# Patient Record
Sex: FEMALE | Race: OTHER | NOT HISPANIC OR LATINO
[De-identification: names, ages, dates, MRNs, and addresses within clinical notes are randomized per-mention and may not be internally consistent; named-entity substitution may affect disease eponyms.]

---

## 2021-10-07 PROBLEM — Z00.00 ENCOUNTER FOR PREVENTIVE HEALTH EXAMINATION: Status: ACTIVE | Noted: 2021-10-07

## 2021-10-11 ENCOUNTER — APPOINTMENT (OUTPATIENT)
Dept: ORTHOPEDIC SURGERY | Facility: CLINIC | Age: 51
End: 2021-10-11
Payer: COMMERCIAL

## 2021-10-11 ENCOUNTER — RESULT REVIEW (OUTPATIENT)
Age: 51
End: 2021-10-11

## 2021-10-11 ENCOUNTER — OUTPATIENT (OUTPATIENT)
Dept: OUTPATIENT SERVICES | Facility: HOSPITAL | Age: 51
LOS: 1 days | End: 2021-10-11
Payer: COMMERCIAL

## 2021-10-11 VITALS
SYSTOLIC BLOOD PRESSURE: 126 MMHG | WEIGHT: 175 LBS | HEART RATE: 80 BPM | OXYGEN SATURATION: 98 % | BODY MASS INDEX: 28.12 KG/M2 | DIASTOLIC BLOOD PRESSURE: 88 MMHG | HEIGHT: 66 IN

## 2021-10-11 DIAGNOSIS — Z78.9 OTHER SPECIFIED HEALTH STATUS: ICD-10-CM

## 2021-10-11 DIAGNOSIS — Z83.3 FAMILY HISTORY OF DIABETES MELLITUS: ICD-10-CM

## 2021-10-11 DIAGNOSIS — Z72.3 LACK OF PHYSICAL EXERCISE: ICD-10-CM

## 2021-10-11 DIAGNOSIS — Z86.39 PERSONAL HISTORY OF OTHER ENDOCRINE, NUTRITIONAL AND METABOLIC DISEASE: ICD-10-CM

## 2021-10-11 DIAGNOSIS — M23.41 LOOSE BODY IN KNEE, RIGHT KNEE: ICD-10-CM

## 2021-10-11 DIAGNOSIS — Z84.1 FAMILY HISTORY OF DISORDERS OF KIDNEY AND URETER: ICD-10-CM

## 2021-10-11 PROCEDURE — 72170 X-RAY EXAM OF PELVIS: CPT | Mod: 26

## 2021-10-11 PROCEDURE — 20610 DRAIN/INJ JOINT/BURSA W/O US: CPT

## 2021-10-11 PROCEDURE — 99204 OFFICE O/P NEW MOD 45 MIN: CPT | Mod: 25

## 2021-10-11 PROCEDURE — 73564 X-RAY EXAM KNEE 4 OR MORE: CPT | Mod: 26,50

## 2021-10-11 PROCEDURE — 72170 X-RAY EXAM OF PELVIS: CPT

## 2021-10-11 PROCEDURE — 73564 X-RAY EXAM KNEE 4 OR MORE: CPT

## 2021-10-11 RX ORDER — ZOLPIDEM TARTRATE 5 MG/1
TABLET, FILM COATED ORAL
Refills: 0 | Status: ACTIVE | COMMUNITY

## 2021-10-11 RX ORDER — LEVOTHYROXINE SODIUM 0.15 MG/1
150 TABLET ORAL
Refills: 0 | Status: ACTIVE | COMMUNITY

## 2021-10-11 RX ORDER — BUPROPION HYDROCHLORIDE 300 MG/1
300 TABLET, EXTENDED RELEASE ORAL
Refills: 0 | Status: ACTIVE | COMMUNITY

## 2021-10-11 NOTE — PHYSICAL EXAM
[de-identified] : General appearance: well nourished and hydrated, pleasant, alert and oriented x 3, cooperative.  \par HEENT: normocephalic, EOM intact, wearing mask, external auditory canal clear.  \par Cardiovascular: no lower leg edema, no varicosities, dorsalis pedis pulses palpable and symmetric.  \par Lymphatics: no palpable lymphadenopathy, no lymphedema.  \par Neurologic: sensation is normal, no muscle weakness in upper or lower extremities, patella tendon reflexes present and symmetric.  \par Dermatologic: skin moist, warm, no rash.  \par Spine: cervical spine with normal lordosis and painless range of motion, thoracic spine with normal kyphosis and painless range of motion, lumbosacral spine with normal lordosis and painless range of motion. \par Gait: normal.  \par \par Left knee:\par - Soft tissue swelling: moderate suprapatellar\par - Ecchymosis: none\par - Erythema: none\par - Effusion: small\par - Wounds: none\par - Alignment: mild valgus\par - Tenderness: circumferential particularly suprapatellar; palpable large loose body in the lateral gutter\par - ROM: 0-135\par - Collateral laxity: none\par - Cruciate laxity: none\par - Popliteal angle (degrees): 45\par - Quad strength: 5/5\par \par Right knee:\par - Soft tissue swelling: moderate suprapatellar\par - Ecchymosis: none\par - Erythema: none\par - Effusion: small\par - Wounds: none\par - Alignment: mild valgus\par - Tenderness: circumferential particularly suprapatellar; palpable large loose body in the lateral gutter\par - ROM: 0-135\par - Collateral laxity: none\par - Cruciate laxity: none\par - Popliteal angle (degrees): 45\par - Quad strength: 5/5 [de-identified] : AP pelvis and 4 views of the bilateral knees (weightbearing AP, weightbearing Jacobsen, weightbearing lateral, and Sunrise) were obtained today, interpreted by me, and reviewed with the patient.\par \par Pelvic alignment: normal\par \par Right hip --\par Alignment: normal\par Arthritis: none\par Deformity: none\par Osteonecrosis: none\par \par Left hip --\par Alignment: normal \par Arthritis: none\par Deformity: none\par Osteonecrosis: none\par \par Right knee -- large ossific loose body in lateral gutter\par Alignment: normal\par Arthritis: moderate tibiofemoral, bone on bone patellofemoral\par Patellar height: normal\par Patellar tracking: lateral facet effacement and lateral subluxation\par \par Left knee -- large ossific loose body in lateral gutter\par Alignment: normal\par Arthritis: moderate tibiofemoral, bone on bone patellofemoral\par Patellar height: normal\par Patellar tracking: lateral facet effacement and lateral subluxation

## 2021-10-11 NOTE — HISTORY OF PRESENT ILLNESS
[___ wks] : [unfilled] week(s) ago [8] : a current pain level of 8/10 [Standing] : standing [Bending] : worsened by bending [Walking] : worsened by walking [Ice] : relieved by ice [Rest] : relieved by rest [Constant] : ~He/She~ states the symptoms seem to be constant [de-identified] : 10/11/21: 49y/o female presenting for evaluation of bilateral knee pain, L > R. Decades of pain essentially throughout her entire adult life, with recent exacerbation for the past 3-4 weeks, no new injury. Works as a  and needs to stand for 12-13 hours daily. Has done three courses of bilateral knee Monovisc; initally gave about 40% pain relief but last series only gave 10-20% pain relief for a few months. Complains of persistent anterosuperior knee swelling worse on the left. Subjective tightness all the time. Takes Aleve and Advil occasionally. Never done PT. \par \par PMH sig for hypothyroidism. Used to smoke cigarettes, now vapes. [de-identified] : patient describes pain as localized, shooting, stabbing and swelling

## 2021-10-11 NOTE — DISCUSSION/SUMMARY
[de-identified] : 49y/o female with bilateral knee osteoarthritis predominantly patellofemoral; with large bilateral loose bodies\par - CSI administered to both knees today\par - Tylenol + meloxicam as needed\par - PT\par - HEP encouraged\par - No further gel injections given failure of previous series\par - RTC q3mo as needed for repeat CSI; no new XRs needed until next Oct. Will be a candidate for staged bilateral TKA once conservative measures lose efficacy but will try to hold out given her age

## 2021-10-11 NOTE — REVIEW OF SYSTEMS
[Joint Pain] : joint pain [Joint Stiffness] : joint stiffness [Joint Swelling] : joint swelling [Sleep Disturbances] : ~T sleep disturbances [Negative] : Heme/Lymph

## 2021-10-11 NOTE — PROCEDURE
[Aspiration] : Aspiration [Injection] : Injection [Bilateral] : of bilateral [Knee Joint] : knee joint [Osteoarthritis] : Osteoarthritis [Patient] : patient [Alcohol] : Alcohol [Betadine] : Betadine [Ethyl Chloride Spray] : ethyl chloride spray was used as a topical anesthetic [18] : an 18-gauge [___ mL Fluid] : [unfilled] mL of [Yellow] : yellow [Clear] : clear [1% Lidocaine___(mL)] : [unfilled] mL of 1% Lidocaine [Triamcinolone 40mg/mL___(mL)] : [unfilled] ~UmL of 40mg/mL triamcinolone [Bandage Applied] : a bandage [Tolerated Well] : The patient tolerated the procedure well [None] : none [de-identified] : superolateral left, inferolateral right [de-identified] : left knee [FreeTextEntry9] : same needle left, new 20ga right

## 2021-12-27 ENCOUNTER — APPOINTMENT (OUTPATIENT)
Dept: ORTHOPEDIC SURGERY | Facility: CLINIC | Age: 51
End: 2021-12-27
Payer: COMMERCIAL

## 2021-12-27 PROCEDURE — 20610 DRAIN/INJ JOINT/BURSA W/O US: CPT | Mod: LT

## 2021-12-27 RX ORDER — AMOXICILLIN 500 MG/1
500 TABLET, FILM COATED ORAL
Qty: 21 | Refills: 0 | Status: ACTIVE | COMMUNITY
Start: 2021-08-02

## 2021-12-27 RX ORDER — ZOLPIDEM TARTRATE 6.25 MG/1
6.25 TABLET, EXTENDED RELEASE ORAL
Qty: 30 | Refills: 0 | Status: ACTIVE | COMMUNITY
Start: 2021-12-07

## 2021-12-27 RX ORDER — CHLORHEXIDINE GLUCONATE, 0.12% ORAL RINSE 1.2 MG/ML
0.12 SOLUTION DENTAL
Qty: 473 | Refills: 0 | Status: ACTIVE | COMMUNITY
Start: 2021-12-06

## 2021-12-27 RX ORDER — AMOXICILLIN AND CLAVULANATE POTASSIUM 500; 125 MG/1; MG/1
500-125 TABLET, FILM COATED ORAL
Qty: 21 | Refills: 0 | Status: ACTIVE | COMMUNITY
Start: 2021-12-14

## 2021-12-27 NOTE — PROCEDURE
[Injection] : Injection [Left] : of the left [Knee Joint] : knee joint [Osteoarthritis] : Osteoarthritis [Patient] : patient [Alcohol] : Alcohol [Betadine] : Betadine [Ethyl Chloride Spray] : ethyl chloride spray was used as a topical anesthetic [Lateral] : lateral [Inferior] : inferior [20] : a 20-gauge [1% Lidocaine___(mL)] : [unfilled] mL of 1% Lidocaine [Triamcinolone 40mg/mL___(mL)] : [unfilled] ~UmL of 40mg/mL triamcinolone [Bandage Applied] : a bandage [Tolerated Well] : The patient tolerated the procedure well [None] : none [de-identified] : Last visit's bilateral CSI were very helpful; right knee is still pain-free but left knee pain began to recur last week. Would like to repeat left knee CSI today. Never started PT but is back to spin classes for exercise.\par \par She is also interested in pursuing biologic treatments for both knees. Will connect to research staff for screening to participate in [facility] +/- TGC trials.

## 2021-12-27 NOTE — PROCEDURE
[Injection] : Injection [Left] : of the left [Knee Joint] : knee joint [Osteoarthritis] : Osteoarthritis [Patient] : patient [Alcohol] : Alcohol [Betadine] : Betadine [Ethyl Chloride Spray] : ethyl chloride spray was used as a topical anesthetic [Lateral] : lateral [Inferior] : inferior [20] : a 20-gauge [1% Lidocaine___(mL)] : [unfilled] mL of 1% Lidocaine [Triamcinolone 40mg/mL___(mL)] : [unfilled] ~UmL of 40mg/mL triamcinolone [Bandage Applied] : a bandage [Tolerated Well] : The patient tolerated the procedure well [None] : none [de-identified] : Last visit's bilateral CSI were very helpful; right knee is still pain-free but left knee pain began to recur last week. Would like to repeat left knee CSI today. Never started PT but is back to spin classes for exercise.\par \par She is also interested in pursuing biologic treatments for both knees. Will connect to research staff for screening to participate in [facility] +/- TGC trials.

## 2022-02-18 ENCOUNTER — APPOINTMENT (OUTPATIENT)
Dept: ORTHOPEDIC SURGERY | Facility: CLINIC | Age: 52
End: 2022-02-18
Payer: COMMERCIAL

## 2022-02-18 VITALS
BODY MASS INDEX: 28.12 KG/M2 | HEIGHT: 66 IN | TEMPERATURE: 98.5 F | DIASTOLIC BLOOD PRESSURE: 85 MMHG | HEART RATE: 87 BPM | WEIGHT: 175 LBS | OXYGEN SATURATION: 98 % | SYSTOLIC BLOOD PRESSURE: 142 MMHG

## 2022-02-18 PROCEDURE — 99213 OFFICE O/P EST LOW 20 MIN: CPT | Mod: 25

## 2022-02-18 PROCEDURE — 20611 DRAIN/INJ JOINT/BURSA W/US: CPT | Mod: LT

## 2022-03-02 ENCOUNTER — APPOINTMENT (OUTPATIENT)
Dept: ORTHOPEDIC SURGERY | Facility: CLINIC | Age: 52
End: 2022-03-02

## 2022-03-07 ENCOUNTER — APPOINTMENT (OUTPATIENT)
Dept: ORTHOPEDIC SURGERY | Facility: CLINIC | Age: 52
End: 2022-03-07
Payer: COMMERCIAL

## 2022-03-07 VITALS
HEART RATE: 86 BPM | DIASTOLIC BLOOD PRESSURE: 85 MMHG | OXYGEN SATURATION: 98 % | SYSTOLIC BLOOD PRESSURE: 124 MMHG | TEMPERATURE: 97 F

## 2022-03-07 PROCEDURE — 99212 OFFICE O/P EST SF 10 MIN: CPT | Mod: 25

## 2022-03-07 PROCEDURE — 20611 DRAIN/INJ JOINT/BURSA W/US: CPT | Mod: LT

## 2022-03-07 RX ORDER — MELOXICAM 15 MG/1
15 TABLET ORAL DAILY
Qty: 30 | Refills: 2 | Status: DISCONTINUED | COMMUNITY
Start: 2021-10-11 | End: 2022-03-07

## 2022-03-07 NOTE — DISCUSSION/SUMMARY
[Medication Risks Reviewed] : Medication risks reviewed [de-identified] : The patient is a 51 year old woman presenting with acute on chronic left knee pain secondary to knee osteoarthritis.\par \par Imaging/Diagnostics/Medical Records were interpreted and/or reviewed and results were reviewed with the patient in detail.  All questions were answered appropriately.\par \par I discussed the treatment of degenerative arthritis with the patient at length today. I described the spectrum of treatment from nonoperative modalities to total joint arthroplasty. Noninvasive and nonoperative treatment modalities include weight reduction, activity modification with low impact exercise, PRN use of acetaminophen or anti-inflammatory medication if tolerated, natural supplements such as glucosamine/chondroitin, and physical therapy. Further treatments can include corticosteroid injection, hyaluronic acid injections, and orthobiologic such as PRP. Definitive treatment can certainly include total joint arthroplasty, but patient would require surgical consultation to discuss that option further. The risks and benefits of each treatment options was discussed and all questions were answered.\par \par \par After informed consent, and explanation of risks, benefits, alternatives, adverse effects of injection, which includes but is not limited to infection, bleeding, allergic reaction, swelling, soft tissue weakening/tendon rupture, neurovascular injury, injection site complication, fat atrophy, skin depigmentation, failure to improve symptoms, the patient would like to proceed with the procedure - LEFT KNEE ULTRASOUND-GUIDED ARTHROCENTESIS/CSI. See procedure note above. Patient tolerated the procedure well. The patient was provided with postinjection instructions.\par \par Patient understands that they may require surgery in the future.\par \par Continue follow-up with Dr. Ha\par \par \par ------------------------------------------------------------------------------------------------------------------\par Patient appreciates and agrees with current plan.\par \par The patient's diagnosis above was evaluated by me, personally.  Diagnostic Testing and treatment options were discussed with the patient in detail.  The risks/benefits/potential complications of diagnostic testing/treatments were described in detail.  \par \par This note was generated using a mixture of manual typing and dragon medical dictation software.  A reasonable effort has been made for proofreading its contents, but typos may still remain.  If there are any questions or points of clarification needed please notify my office.\par \par \par >30 minutes of time was spent on total encounter.  >50% of the visit was spent on face-to-face counseling/coordination of care and medical-decision making for this patient.\par \par

## 2022-03-07 NOTE — PROCEDURE
[de-identified] : Ultrasound-Guided LEFT Knee Arthrocentesis\par \par Indication for U/S Guidance: Ensure placement within the tibiofemoral joint for diagnostic purposes, while avoiding neurovascular structures\par \par Indication for Injection: KNEE OSTEOARTHRITIS\par \par A discussion was had with the patient regarding this procedure and all questions were answered. All risks, benefits and alternatives were discussed. These included but were not limited to bleeding, infection, injection site reaction/complication and allergic reaction. A timeout was done to ensure correct side and pt agreed to the procedure. Betadine was used to sterilize and prep the area, and alcohol was used to clean the skin in the anterior aspect of the knee joint. The suprapatellar space was visualized utilizing the Sonosite, linear transducer. The joint was visualized in the short axis and an in-plane approach was used for the injection. Ultrasound guidance was utilized to ensure accuracy of the intra-articular aspiration, and avoid the neurovascular structures. A 25-gauge 1.5" needle was used to inject 2cc of 1% lidocaine without epi into the SubQ.  This was followed by aspiration with an 18-gauge 1.5" needle with aspiration of 35cc of hemarthrosis.  This was followed by 2cc of 1% lidocaine with out epi, 1cc of 0.5% bupivicaine and 1cc of kenalog.   A sterile bandage was then applied. The patient tolerated the procedure well and there were no complications.\par

## 2022-03-07 NOTE — HISTORY OF PRESENT ILLNESS
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 Ha, and is being treated conservatively given age, with nonoperative treatment.  At her initial visit with Dr. Ha,  she had bilateral knee CSI.\par \par She presents with acute on chronic left knee pain and swelling.\par \par Pain is rated 4/10, described as stiff, improved with rest, worse with walking.

## 2022-03-07 NOTE — PHYSICAL EXAM
[de-identified] : General: Well-nourished, well-developed, alert, and in no acute distress.\par Head: Normocephalic.\par Eyes: Pupils equal round reactive to light and accommodation, extraocular muscles intact, normal sclera.\par Nose: No nasal discharge.\par Cardiac: Regular rate. Extremities are warm and well perfused. Distal pulses are symmetric bilaterally.\par Respiratory: No labored breathing.\par Extremities: Sensation is intact distally bilaterally.  Distal pulses are symmetric bilaterally\par Lymphatic: No regional lymphadenopathy, no lymphedema\par Neurologic: No focal deficits\par Skin: Normal skin color, texture, and turgor\par Psychiatric: Normal affect\par MSK: as noted above/below\par \par \par \par LEFT KNEE:\par \par Inspection: no bruising, erythema\par Joint Effusion:MODERATE\par ROM: Knee Flexion 90 WITH PAIN Knee Extension 0\par Palpation:MEDIAL JOINT LINE PAIN, PERIPATELLAR PAIN, No pain at patellar tendon, MFC/LFC, Medial/Lateral Tibial Plateau\par Leg Length Discrepancy:no\par Patella: no apprehension, +COMPRESSION\par Distal Pulses: normal\par Lower Extremity Strength:normal, 5/5 \par Lower Extremity Reflexes:normal, 2+\par Lower Extremity Sensation: normal\par \par Special Tests:\par Dejuan: EQUIOVOCAL\par Anterior Drawer:Negative\par Posterior Drawer:Negative \par Varus/Valgus:Negative, no instability\par \par RIGHT KNEE:\par \par Inspection: no bruising, swelling, erythema\par Joint Effusion:no \par ROM: Knee Flexion 110 , Knee Extension 0\par Palpation:MEDIAL JOINT LINE PAIN, PERIPATELLAR PAIN, No pain at patellar tendon, MFC/LFC, Medial/Lateral Tibial Plateau\par Leg Length Discrepancy:no\par Patella: no apprehension\par Distal Pulses: normal\par Lower Extremity Strength:normal, 5/5 \par Lower Extremity Reflexes:normal, 2+\par Lower Extremity Sensation: normal\par \par Special Tests:\par Clarissa:Negative \par Dejuan: Negative\par Anterior Drawer:Negative\par Posterior Drawer:Negative \par Varus/Valgus:Negative, no instability\par \par \par

## 2022-03-07 NOTE — HISTORY OF PRESENT ILLNESS
[de-identified] : The patient is a 51 year old woman presenting with knee pain.\par \par She runs as [employer] in .\par \par Patient has a known history of bilateral knee osteoarthritis.\par \par She was previously seen by Dr. Ha, and is being treated conservatively given age, with nonoperative treatment.  At her initial visit with Dr. Ha,  she had bilateral knee CSI.\par \par She was last seen by me about 3 weeks ago for a several day history of acute on chronic, left knee pain and swelling.  She had knee arthrocentesis and CSI.\par \par Her pain and swelling recurred a few days ago.  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

## 2022-03-07 NOTE — DISCUSSION/SUMMARY
[de-identified] : The patient is a 51 year old woman presenting with acute on chronic left knee pain secondary to knee osteoarthritis with loose bodies.\par \par We again discussed natural progression of arthritis, as well as nonoperative and operative treatment options.\par \par After informed consent, and explanation of risks, benefits, alternatives, adverse effects of injection, which includes but is not limited to infection, bleeding, allergic reaction, swelling, soft tissue weakening/tendon rupture, neurovascular injury, injection site complication, fat atrophy, skin depigmentation, failure to improve symptoms, the patient would like to proceed with the procedure -LEFT KNEE ULTRASOUND-GUIDED ARTHROCENTESIS. See procedure note above. Patient tolerated the procedure well. The patient was provided with postinjection instructions.\par \par \par The patient was counseled on Protection-Rest-Ice-Compression-Elevation (PRICE).\par \par Patient was prescribed a short course of Indomethacin.Appropriate use of medication was reviewed with the patient in detail. Risks, benefits, and adverse effects medication were discussed.\par \par MRI ordered to evaluate for bucket-handle meniscus tear, evaluate loose body\par \par Advised to keep follow-up with Dr. Ha\par \par \par \par ------------------------------------------------------------------------------------------------------------------\par Patient appreciates and agrees with current plan.\par \par The patient's diagnosis above was evaluated by me, personally.  Diagnostic Testing and treatment options were discussed with the patient in detail.  The risks/benefits/potential complications of diagnostic testing/treatments were described in detail.  \par \par This note was generated using a mixture of manual typing and dragon medical dictation software.  A reasonable effort has been made for proofreading its contents, but typos may still remain.  If there are any questions or points of clarification needed please notify my office.\par \par \par >15 minutes of time was spent on total encounter.  >50% of the visit was spent on face-to-face counseling/coordination of care and medical-decision making for this patient.\par \par

## 2022-03-07 NOTE — PROCEDURE
[de-identified] : Ultrasound-Guided LEFT Knee Arthrocentesis\par \par Indication for U/S Guidance: Ensure placement within the tibiofemoral joint for diagnostic purposes, while avoiding neurovascular structures\par \par Indication for Injection: KNEE OSTEOARTHRITIS\par \par A discussion was had with the patient regarding this procedure and all questions were answered. All risks, benefits and alternatives were discussed. These included but were not limited to bleeding, infection, injection site reaction/complication and allergic reaction. A timeout was done to ensure correct side and pt agreed to the procedure. Betadine was used to sterilize and prep the area, and alcohol was used to clean the skin in the anterior aspect of the knee joint. The suprapatellar space was visualized utilizing the Sonosite, linear transducer. The joint was visualized in the short axis and an in-plane approach was used for the injection. Ultrasound guidance was utilized to ensure accuracy of the intra-articular aspiration, and avoid the neurovascular structures. A 25-gauge 1.5" needle was used to inject 2cc of 1% lidocaine without epi into the SubQ.  This was followed by aspiration with an 18-gauge 1.5" needle with aspiration of 35cc of hemarthrosis.     A sterile bandage was then applied. The patient tolerated the procedure well and there were no complications.\par

## 2022-03-07 NOTE — PHYSICAL EXAM
[de-identified] : General: Well-nourished, well-developed, alert, and in no acute distress.\par Head: Normocephalic.\par Eyes: Pupils equal round reactive to light and accommodation, extraocular muscles intact, normal sclera.\par Nose: No nasal discharge.\par Cardiac: Regular rate. Extremities are warm and well perfused. Distal pulses are symmetric bilaterally.\par Respiratory: No labored breathing.\par Extremities: Sensation is intact distally bilaterally.  Distal pulses are symmetric bilaterally\par Lymphatic: No regional lymphadenopathy, no lymphedema\par Neurologic: No focal deficits\par Skin: Normal skin color, texture, and turgor\par Psychiatric: Normal affect\par MSK: as noted above/below\par \par \par \par LEFT KNEE:\par \par Inspection: no bruising, erythema\par Joint Effusion:MODERATE\par ROM: Knee Flexion 90 WITH PAIN Knee Extension 0\par Palpation:MEDIAL JOINT LINE PAIN, PERIPATELLAR PAIN, No pain at patellar tendon, MFC/LFC, Medial/Lateral Tibial Plateau\par Leg Length Discrepancy:no\par Patella: no apprehension, +COMPRESSION\par Distal Pulses: normal\par Lower Extremity Strength:normal, 5/5 \par Lower Extremity Reflexes:normal, 2+\par Lower Extremity Sensation: normal\par \par Special Tests:\par Dejuan: EQUIOVOCAL\par Anterior Drawer:Negative\par Posterior Drawer:Negative \par Varus/Valgus:Negative, no instability\par \par RIGHT KNEE:\par \par Inspection: no bruising, swelling, erythema\par Joint Effusion:no \par ROM: Knee Flexion 110 , Knee Extension 0\par Palpation:MEDIAL JOINT LINE PAIN, PERIPATELLAR PAIN, No pain at patellar tendon, MFC/LFC, Medial/Lateral Tibial Plateau\par Leg Length Discrepancy:no\par Patella: no apprehension\par Distal Pulses: normal\par Lower Extremity Strength:normal, 5/5 \par Lower Extremity Reflexes:normal, 2+\par Lower Extremity Sensation: normal\par \par Special Tests:\par Clarissa:Negative \par Dejuan: Negative\par Anterior Drawer:Negative\par Posterior Drawer:Negative \par Varus/Valgus:Negative, no instability\par \par \par

## 2022-03-28 ENCOUNTER — APPOINTMENT (OUTPATIENT)
Dept: ORTHOPEDIC SURGERY | Facility: CLINIC | Age: 52
End: 2022-03-28

## 2022-06-28 ENCOUNTER — APPOINTMENT (OUTPATIENT)
Dept: ORTHOPEDIC SURGERY | Facility: CLINIC | Age: 52
End: 2022-06-28
Payer: COMMERCIAL

## 2022-06-28 VITALS
OXYGEN SATURATION: 100 % | WEIGHT: 175 LBS | BODY MASS INDEX: 28.12 KG/M2 | HEIGHT: 66 IN | SYSTOLIC BLOOD PRESSURE: 148 MMHG | HEART RATE: 89 BPM | DIASTOLIC BLOOD PRESSURE: 90 MMHG

## 2022-06-28 DIAGNOSIS — G47.00 INSOMNIA, UNSPECIFIED: ICD-10-CM

## 2022-06-28 PROCEDURE — 20611 DRAIN/INJ JOINT/BURSA W/US: CPT

## 2022-06-28 PROCEDURE — 99212 OFFICE O/P EST SF 10 MIN: CPT | Mod: 25

## 2022-06-28 RX ORDER — VALACYCLOVIR 1 G/1
1 TABLET, FILM COATED ORAL
Qty: 20 | Refills: 0 | Status: ACTIVE | COMMUNITY
Start: 2022-04-04

## 2022-06-28 RX ORDER — INDOMETHACIN 50 MG/1
50 CAPSULE ORAL TWICE DAILY
Qty: 60 | Refills: 2 | Status: COMPLETED | COMMUNITY
Start: 2022-03-07 | End: 2022-06-28

## 2022-06-28 NOTE — DISCUSSION/SUMMARY
[Medication Risks Reviewed] : Medication risks reviewed [de-identified] : The patient is a 51 year old woman presenting with acute on chronic left knee pain secondary to knee osteoarthritis with loose bodies.\par \par We again discussed natural progression of arthritis, as well as nonoperative and operative treatment options.\par \par Patient understands that they may require surgery in the future.\par \par After informed consent, and explanation of risks, benefits, alternatives, adverse effects of injection, which includes but is not limited to infection, bleeding, allergic reaction, swelling, soft tissue weakening/tendon rupture, neurovascular injury, injection site complication, fat atrophy, skin depigmentation, failure to improve symptoms, the patient would like to proceed with the procedure - LEFT KNEE ULTRASOUND GUIDED CSI. See procedure note above. Patient tolerated the procedure well. The patient was provided with postinjection instructions.\par \par Patient was prescribed a short course of Etodolac.Appropriate use of medication was reviewed with the patient in detail. Risks, benefits, and adverse effects medication were discussed.\par \par Provided one fill of chronic lunesta medication as she lost her PMD.  I gave her PMD referrals.\par \par \par Continue follow-up with Dr. Ha.\par \par I explained to the patient that I will be leaving at the end of July 2022.  With established patients, I will continue to provide care during that time.  I explained that the Orthopedic team will be sending out letters regarding my departure and we have provided referrals in-office as requested.\par \par \par \par ------------------------------------------------------------------------------------------------------------------\par Patient appreciates and agrees with current plan.\par \par The patient's diagnosis above was evaluated by me, personally.  Diagnostic Testing and treatment options were discussed with the patient in detail.  The risks/benefits/potential complications of diagnostic testing/treatments were described in detail.  \par \par This note was generated using a mixture of manual typing and dragon medical dictation software.  A reasonable effort has been made for proofreading its contents, but typos may still remain.  If there are any questions or points of clarification needed please notify my office.\par \par \par >15 minutes of time was spent on total encounter.  >50% of the visit was spent on face-to-face counseling/coordination of care and medical-decision making for this patient.\par \par

## 2022-06-28 NOTE — PROCEDURE
[de-identified] : Ultrasound-Guided LEFT Knee Arthrocentesis\par \par Indication for U/S Guidance: Ensure placement within the tibiofemoral joint for diagnostic purposes, while avoiding neurovascular structures\par \par Indication for Injection: KNEE OSTEOARTHRITIS\par \par A discussion was had with the patient regarding this procedure and all questions were answered. All risks, benefits and alternatives were discussed. These included but were not limited to bleeding, infection, injection site reaction/complication and allergic reaction. A timeout was done to ensure correct side and pt agreed to the procedure. Betadine was used to sterilize and prep the area, and alcohol was used to clean the skin in the anterior aspect of the knee joint. The suprapatellar space was visualized utilizing the Sonosite, linear transducer. The joint was visualized in the short axis and an in-plane approach was used for the injection. Ultrasound guidance was utilized to ensure accuracy of the intra-articular aspiration, and avoid the neurovascular structures. A 25-gauge 1.5" needle was used to inject 2cc of 1% lidocaine without epi into the SubQ. This was followed by aspiration with an 18-gauge 1.5" needle with aspiration of 10CC OF NONPURULENT, CLEAR YELLOW SYNOVIAL. FLUID.. This was followed by 2cc of 1% lidocaine with out epi, 1cc of 0.5% bupivicaine and 1cc of kenalog. A sterile bandage was then applied. The patient tolerated the procedure well and there were no complications.\par

## 2022-06-28 NOTE — HISTORY OF PRESENT ILLNESS
[de-identified] : The patient is a 51 year old woman presenting with knee pain.\par \par She runs as [employer] in .\par \par Patient has a known history of bilateral knee osteoarthritis.\par \par She was previously seen by Dr. Ha, and is being treated conservatively given age, with nonoperative treatment.  At her initial visit with Dr. Ha,  she had bilateral knee CSI.\par \par She was last seen by me about >3 months ago for a several day history of acute on chronic, left knee pain and swelling.  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

## 2022-07-02 ENCOUNTER — EMERGENCY (EMERGENCY)
Facility: HOSPITAL | Age: 52
LOS: 1 days | Discharge: HOME | End: 2022-07-02
Attending: EMERGENCY MEDICINE | Admitting: EMERGENCY MEDICINE
Payer: COMMERCIAL

## 2022-07-02 VITALS
RESPIRATION RATE: 16 BRPM | OXYGEN SATURATION: 98 % | HEART RATE: 75 BPM | DIASTOLIC BLOOD PRESSURE: 94 MMHG | SYSTOLIC BLOOD PRESSURE: 157 MMHG | TEMPERATURE: 98 F

## 2022-07-02 VITALS
DIASTOLIC BLOOD PRESSURE: 96 MMHG | HEART RATE: 89 BPM | RESPIRATION RATE: 18 BRPM | SYSTOLIC BLOOD PRESSURE: 132 MMHG | TEMPERATURE: 98 F | HEIGHT: 66 IN | OXYGEN SATURATION: 96 % | WEIGHT: 184.97 LBS

## 2022-07-02 LAB
B PERT IGG+IGM PNL SER: SIGNIFICANT CHANGE UP
COLOR FLD: SIGNIFICANT CHANGE UP
FLUID INTAKE SUBSTANCE CLASS: SIGNIFICANT CHANGE UP
GRAM STN FLD: SIGNIFICANT CHANGE UP
LYMPHOCYTES # FLD: 18 % — SIGNIFICANT CHANGE UP
MONOS+MACROS # FLD: 32 % — SIGNIFICANT CHANGE UP
NEUTROPHILS-BODY FLUID: 50 % — SIGNIFICANT CHANGE UP
RCV VOL RI: HIGH /UL (ref 0–0)
SPECIMEN SOURCE FLD: SIGNIFICANT CHANGE UP
SPECIMEN SOURCE: SIGNIFICANT CHANGE UP
SYNOVIAL CRYSTALS CLARITY: ABNORMAL
SYNOVIAL CRYSTALS COLOR: ABNORMAL
SYNOVIAL CRYSTALS ID: SIGNIFICANT CHANGE UP
SYNOVIAL CRYSTALS TUBE: SIGNIFICANT CHANGE UP
TOTAL NUCLEATED CELL COUNT, BODY FLUID: 2071 /UL — SIGNIFICANT CHANGE UP
TUBE TYPE: SIGNIFICANT CHANGE UP

## 2022-07-02 PROCEDURE — 99284 EMERGENCY DEPT VISIT MOD MDM: CPT | Mod: 25

## 2022-07-02 PROCEDURE — 20610 DRAIN/INJ JOINT/BURSA W/O US: CPT | Mod: LT

## 2022-07-02 PROCEDURE — 89051 BODY FLUID CELL COUNT: CPT

## 2022-07-02 PROCEDURE — 89060 EXAM SYNOVIAL FLUID CRYSTALS: CPT

## 2022-07-02 PROCEDURE — 99284 EMERGENCY DEPT VISIT MOD MDM: CPT

## 2022-07-02 PROCEDURE — 87205 SMEAR GRAM STAIN: CPT

## 2022-07-02 PROCEDURE — 87070 CULTURE OTHR SPECIMN AEROBIC: CPT

## 2022-07-02 PROCEDURE — 87075 CULTR BACTERIA EXCEPT BLOOD: CPT

## 2022-07-02 NOTE — CONSULT NOTE ADULT - SUBJECTIVE AND OBJECTIVE BOX
Orthopaedic Surgery Consult Note    Attending Physician:  Consult requested by:     CC:     HPI:  51yFemale with pmh of b/l knee OA presenting with L knee swelling and stiffness x 3 days. Pt states 4 days ago she had her knee drained and was given a cortisone injection. Pt felt better after it was drained by the following day around 10am the knee blew up. Pt called her orthopedist who told her it was likely a cortisone flare and was told to take indomethacin twice daily and ice her knee. Pt states the swelling improved only slightly but still very swollen and stiff. Denies fever, chills, redness. Pt was told to come to the ED to have the knee drained again.    Allergies    No Known Allergies    Intolerances      PAST MEDICAL & SURGICAL HISTORY:      Meds:      Family History:  Denies family history of bleeding disorders    Social History:   Pt is a nonsmoker  Social EtOH use     Review of Systems:  All review of systems are negative except for those mentioned in HPI.     Physical Exam:  General: Pt Alert and oriented, NAD  L knee swollen, non erythematous, not warm. Previous aspiration site noted 1cm lateral to superolateral pole of patella  L knee ranging 0-110, painless arc of motion  Pulses: 2+ dp, pt pulses, wwp, cap refill <3 seconds  Sensation: SILT sural/saph/sp/dp/ tibial distributions  Motor: 5/5 EHL/FHL/TA/GS b/l LE    Vital Signs Last 24 Hrs  T(C): 36.8 (02 Jul 2022 13:07), Max: 36.8 (02 Jul 2022 13:07)  T(F): 98.3 (02 Jul 2022 13:07), Max: 98.3 (02 Jul 2022 13:07)  HR: 75 (02 Jul 2022 13:07) (75 - 89)  BP: 157/94 (02 Jul 2022 13:07) (132/96 - 157/94)  BP(mean): --  RR: 16 (02 Jul 2022 13:07) (16 - 18)  SpO2: 98% (02 Jul 2022 13:07) (96% - 98%)      Labs:  Pt refusing basic laboratory work up            Imaging:   Patient refused knee xrays     A/P: 51yFemale with L knee OA s/p L knee therapeutic tap and cortisone injections outpatient now presenting with recurrent L knee effusion likely related to underlying OA. Knee aspiration yielded 88cc of sanguinous fluid with relief of pain. Aspirate sent for cell count, culture, crystals. Patient unwilling to wait for results of knee aspiration.  - aseptic, no suspicion of septic arthritis  - pain control, continue anti-inflammatory  - c/w RICE  - f/u results of knee aspirate  - f/u with Dr. Ha or [facility] outpatient for results and additional intervention  - WBS: WBAT  - Discussed with Attending, Dr. Leland Solano, PGY-2  Ortho Pager [phone]

## 2022-07-02 NOTE — ED PROVIDER NOTE - ATTENDING APP SHARED VISIT CONTRIBUTION OF CARE
50 yo F with pmh of arthritis c/o L knee swelling and stiffness x 3 days. Pt states 4 days ago she had her knee drained and was given a cortisone injection. Pt felt better after it was drained by the following day around 10am the knee blew up. Pt called her orthopedist who told her it was likely a cortisone flare and was told to take indomethacin twice daily and ice her knee. Pt states the swelling improved only slightly but still very swollen and stiff. Denies fever, chills, redness. Pt was told to come to the ED to have the knee drained again.

## 2022-07-02 NOTE — ED PROVIDER NOTE - PATIENT PORTAL LINK FT
You can access the FollowMyHealth Patient Portal offered by [facility] by registering at the following website: [email or web address]. By joining [employer]’s FollowMyHealth portal, you will also be able to view your health information using other applications (apps) compatible with our system.

## 2022-07-02 NOTE — ED PROVIDER NOTE - OBJECTIVE STATEMENT
52 yo F with pmh of arthritis c/o L knee swelling and stiffness x 3 days. Pt states 4 days ago she had her knee drained and was given a cortisone injection. Pt felt better after it was drained by the following day around 10am the knee blew up. Pt called her orthopedist who told her it was likely a cortisone flare and was told to take indomethacin twice daily and ice her knee. Pt states the swelling improved only slightly but still very swollen and stiff. Denies fever, chills, redness. Pt was told to come to the ED to have the knee drained again.

## 2022-07-02 NOTE — ED PROVIDER NOTE - CARE PROVIDER_API CALL
Miguelangel Fitch (DO)  Sports Medicine  [address] [place] [address]  Phone: [phone]  Fax: [phone]  Follow Up Time:     Esau Ha (MD)  Orthopedics  [address] [place] [address]  Phone: [phone]  Fax: [phone]  Follow Up Time:

## 2022-07-02 NOTE — ED PROVIDER NOTE - PHYSICAL EXAMINATION
CONSTITUTIONAL: Well-appearing;  in no apparent distress.   HEAD: Normocephalic; atraumatic.   EYES: PERRL; EOM intact; conjunctiva and sclera clear  ENT: normal nose; no rhinorrhea; normal pharynx with no erythema or lesions.   NECK: Supple; non-tender;   CARDIOVASCULAR: rrr, no audible murmurs   RESPIRATORY: Breathing easily;   MSK: L knee- + swelling, no erythema or warmth, nontender, FROM   EXT: No cyanosis or edema; N/V intact  SKIN: Normal for age and race; warm; dry; good turgor; no apparent lesions or rash.

## 2022-07-02 NOTE — ED ADULT NURSE NOTE - OBJECTIVE STATEMENT
pt reports chronic swelling of left knee which frequently requires aspiration by orthopedics.  pt states saw MD Fitch on Wednesday for a cortisone injection in left knee.  pt reports swelling in left knee after injection, which is now improving.  pt states was told by ortho PA to come to ED today to have left knee aspirated.  pt denies warmth, redness at left knee.  pt denies fever.  pt denies any pain.    +swelling noted left anterior knee.  2+ left dp pulse.

## 2022-07-02 NOTE — ED ADULT TRIAGE NOTE - CHIEF COMPLAINT QUOTE
Pt reports getting drainage of the left knee and cortisone shot on Wednesday by MD Fitch. pt reports knee stiffness and increased swelling since Thursday morning. Pt sent by ortho office. Hx arthritis, and calficiations

## 2022-07-02 NOTE — ED PROVIDER NOTE - NSFOLLOWUPINSTRUCTIONS_ED_ALL_ED_FT
Knee Arthrocentesis, Care After      The following information offers guidance on how to care for yourself after your procedure. Your health care provider may also give you more specific instructions. If you have problems or questions, contact your health care provider.      What can I expect after the procedure?    After the procedure, it is common to have:  •Bruising.      •Swelling.      •Soreness.        Follow these instructions at home:      Managing pain, stiffness, and swelling    •If directed, put ice on your knee. To do this:  •Put ice in a plastic bag.      •Place a towel between your skin and the bag.      •Leave the ice on for 20 minutes, 2–3 times a day.      •Remove the ice if your skin turns bright red. This is very important. If you cannot feel pain, heat, or cold, you have a greater risk of damage to the area.        • Do not put heat on your knee.      •Wrap your knee to help prevent swelling as told by your health care provider. Ask your health care provider to show you how to wrap your knee.      •Move your toes often to reduce stiffness and swelling.      •Raise (elevate) your knee above the level of your heart while you are sitting or lying down. To do this, try putting a few pillows under your knee and lower leg.      Puncture site care     •Change your bandage (dressing) as told by your health care provider. Make sure you wash your hands with soap and water for at least 20 seconds before and after you change your dressing. If soap and water are not available, use hand .    •Check your puncture site every day for signs of infection. Check for:  •More redness, swelling, or pain.      •Fluid or blood.      •Warmth.      •Pus or a bad smell.          Activity      •Return to your normal activities as told by your health care provider. Ask your health care provider what activities are safe for you and what you need to avoid. In some cases, you may be able to return to normal activities right away.      •If you were given a medicine to help you relax (sedative) during the procedure, it can affect you for several hours. Do not drive or operate machinery until your health care provider says that it is safe.      General instructions     • Do not take baths, swim, or use a hot tub until your health care provider approves. Ask your health care provider if you may take showers.      •Take over-the-counter and prescription medicines only as told by your health care provider.      •Keep all follow-up visits. This is important.        Contact a health care provider if:  •You have swelling or stiffness that:  •Gets worse.       •Does not get better with medicine.        •You have a fever or chills.    •You have any of these signs of infection around your puncture site:  •More redness, swelling, or pain.      •Fluid or blood draining.      •Unusual warmth.      •Pus or a bad smell.          Get help right away if:    •You have severe pain.        Summary    •It is common to have mild soreness for a couple of days after this procedure.      •Talk with your health care provider about how to use ice and wraps to prevent swelling.      •Get help right away if you have severe pain.      This information is not intended to replace advice given to you by your health care provider. Make sure you discuss any questions you have with your health care provider.

## 2022-07-02 NOTE — ED PROVIDER NOTE - NS ED ATTENDING STATEMENT MOD
This was a shared visit with the LENNY. I reviewed and verified the documentation and independently performed the documented:

## 2022-07-02 NOTE — ED PROVIDER NOTE - CLINICAL SUMMARY MEDICAL DECISION MAKING FREE TEXT BOX
52 yo F with pmh of arthritis c/o L knee swelling and stiffness x 3 days. Pt states 4 days ago she had her knee drained and was given a cortisone injection. Pt felt better after it was drained by the following day around 10am the knee blew up. Pt called her orthopedist who told her it was likely a cortisone flare and was told to take indomethacin twice daily and ice her knee. Pt states the swelling improved only slightly but still very swollen and stiff. Denies fever, chills, redness. Pt was told to come to the ED to have the knee drained again.  L knee- + swelling, no erythema or warmth, nontender, FROM 50 yo F with pmh of arthritis c/o L knee swelling and stiffness x 3 days. Pt states 4 days ago she had her knee drained and was given a cortisone injection. Pt felt better after it was drained by the following day around 10am the knee blew up. Pt called her orthopedist who told her it was likely a cortisone flare and was told to take indomethacin twice daily and ice her knee. Pt states the swelling improved only slightly but still very swollen and stiff. Denies fever, chills, redness. Pt was told to come to the ED to have the knee drained again.  L knee- + swelling, no erythema or warmth, nontender, FROM. Knee drained by ortho, sent for cell count however pt does not want to wait. Okay to dc as per ortho, Dr. Ha will follow up withe results

## 2022-07-04 DIAGNOSIS — M25.462 EFFUSION, LEFT KNEE: ICD-10-CM

## 2022-07-04 DIAGNOSIS — M19.90 UNSPECIFIED OSTEOARTHRITIS, UNSPECIFIED SITE: ICD-10-CM

## 2022-07-07 ENCOUNTER — APPOINTMENT (OUTPATIENT)
Dept: ORTHOPEDIC SURGERY | Facility: CLINIC | Age: 52
End: 2022-07-07

## 2022-07-07 VITALS
OXYGEN SATURATION: 98 % | DIASTOLIC BLOOD PRESSURE: 86 MMHG | HEART RATE: 102 BPM | SYSTOLIC BLOOD PRESSURE: 129 MMHG | TEMPERATURE: 98.1 F | BODY MASS INDEX: 30.22 KG/M2 | WEIGHT: 188 LBS | HEIGHT: 66 IN

## 2022-07-07 DIAGNOSIS — M23.42 LOOSE BODY IN KNEE, LEFT KNEE: ICD-10-CM

## 2022-07-07 DIAGNOSIS — M17.0 BILATERAL PRIMARY OSTEOARTHRITIS OF KNEE: ICD-10-CM

## 2022-07-07 PROCEDURE — 99212 OFFICE O/P EST SF 10 MIN: CPT | Mod: 25

## 2022-07-07 PROCEDURE — 20611 DRAIN/INJ JOINT/BURSA W/US: CPT

## 2022-07-07 RX ORDER — IBUPROFEN 800 MG/1
800 TABLET, FILM COATED ORAL
Qty: 15 | Refills: 0 | Status: COMPLETED | COMMUNITY
Start: 2021-08-02 | End: 2022-07-07

## 2022-07-07 RX ORDER — TRAMADOL HYDROCHLORIDE 50 MG/1
50 TABLET, COATED ORAL
Qty: 14 | Refills: 0 | Status: ACTIVE | COMMUNITY
Start: 2022-07-07 | End: 1900-01-01

## 2022-07-07 RX ORDER — ESZOPICLONE 3 MG/1
3 TABLET, FILM COATED ORAL
Qty: 30 | Refills: 0 | Status: DISCONTINUED | COMMUNITY
Start: 2022-05-26 | End: 2022-07-07

## 2022-07-07 RX ORDER — ACETAMINOPHEN AND CODEINE 300; 30 MG/1; MG/1
300-30 TABLET ORAL
Qty: 12 | Refills: 0 | Status: COMPLETED | COMMUNITY
Start: 2021-12-07 | End: 2022-07-07

## 2022-07-07 RX ORDER — METHYLPREDNISOLONE 4 MG/1
4 TABLET ORAL
Qty: 21 | Refills: 0 | Status: COMPLETED | COMMUNITY
Start: 2021-12-14 | End: 2022-07-07

## 2022-07-07 RX ORDER — DICLOFENAC SODIUM 50 MG/1
50 TABLET, DELAYED RELEASE ORAL
Qty: 60 | Refills: 1 | Status: ACTIVE | COMMUNITY
Start: 2022-07-07 | End: 1900-01-01

## 2022-07-07 RX ORDER — ESZOPICLONE 3 MG/1
3 TABLET, FILM COATED ORAL
Qty: 30 | Refills: 0 | Status: ACTIVE | COMMUNITY
Start: 2022-07-07 | End: 1900-01-01

## 2022-07-07 RX ORDER — ETODOLAC 400 MG/1
400 TABLET, FILM COATED ORAL
Qty: 30 | Refills: 1 | Status: DISCONTINUED | COMMUNITY
Start: 2022-06-28 | End: 2022-07-07

## 2022-07-07 NOTE — PROCEDURE
[de-identified] : Ultrasound-Guided LEFT Knee Arthrocentesis\par \par Indication for U/S Guidance: Ensure placement within the tibiofemoral joint for diagnostic purposes, while avoiding neurovascular structures\par \par Indication for Injection: KNEE OSTEOARTHRITIS\par \par A discussion was had with the patient regarding this procedure and all questions were answered. All risks, benefits and alternatives were discussed. These included but were not limited to bleeding, infection, injection site reaction/complication and allergic reaction. A timeout was done to ensure correct side and pt agreed to the procedure. Betadine was used to sterilize and prep the area, and alcohol was used to clean the skin in the anterior aspect of the knee joint. The suprapatellar space was visualized utilizing the Sonosite, linear transducer. The joint was visualized in the short axis and an in-plane approach was used for the injection. Ultrasound guidance was utilized to ensure accuracy of the intra-articular aspiration, and avoid the neurovascular structures. A 20-gauge 1.5" needle was used to inject 2cc of 1% lidocaine without epi into the SubQ. This was followed by aspiration  aspiration of 20CC OF NONPURULENT, hemarthrosis.  A sterile bandage was then applied. The patient tolerated the procedure well and there were no complications.\par

## 2022-07-07 NOTE — HISTORY OF PRESENT ILLNESS
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 Ha, and is being treated conservatively given age, with nonoperative treatment.  At her initial visit with Dr. Ha,  she had bilateral knee CSI.\par \par She was last seen by me about ~1 week ago for acute on chronic, left knee pain and swelling.  She had knee arthrocentesis and CSI.\par \par She's had recurrence of left knee pain and swelling 1 day after injection, and had ED visit on 7/2, where she had arthrocentesis with hemarthrosis without signs of infection

## 2022-07-07 NOTE — PHYSICAL EXAM
[de-identified] : General: Well-nourished, well-developed, alert, and in no acute distress.\par Head: Normocephalic.\par Eyes: Pupils equal round reactive to light and accommodation, extraocular muscles intact, normal sclera.\par Nose: No nasal discharge.\par Cardiac: Regular rate. Extremities are warm and well perfused. Distal pulses are symmetric bilaterally.\par Respiratory: No labored breathing.\par Extremities: Sensation is intact distally bilaterally.  Distal pulses are symmetric bilaterally\par Lymphatic: No regional lymphadenopathy, no lymphedema\par Neurologic: No focal deficits\par Skin: Normal skin color, texture, and turgor\par Psychiatric: Normal affect\par MSK: as noted above/below\par \par \par \par LEFT KNEE:\par \par Inspection: no bruising, erythema\par Joint Effusion:MODERATE\par ROM: Knee [id number] WITH PAIN Knee Extension 0\par Palpation:MEDIAL JOINT LINE PAIN, PERIPATELLAR PAIN, No pain at patellar tendon, MFC/LFC, Medial/Lateral Tibial Plateau\par Leg Length Discrepancy:no\par Patella: no apprehension, +COMPRESSION\par Distal Pulses: normal\par Lower Extremity Strength:normal, 5/5 \par Lower Extremity Reflexes:normal, 2+\par Lower Extremity Sensation: normal\par \par Special Tests:\par Dejuan: EQUIOVOCAL\par Anterior Drawer:Negative\par Posterior Drawer:Negative \par Varus/Valgus:Negative, no instability\par \par RIGHT KNEE:\par \par Inspection: no bruising, swelling, erythema\par Joint Effusion:no \par ROM: Knee Flexion 110 , Knee Extension 0\par Palpation:MEDIAL JOINT LINE PAIN, PERIPATELLAR PAIN, No pain at patellar tendon, MFC/LFC, Medial/Lateral Tibial Plateau\par Leg Length Discrepancy:no\par Patella: no apprehension\par Distal Pulses: normal\par Lower Extremity Strength:normal, 5/5 \par Lower Extremity Reflexes:normal, 2+\par Lower Extremity Sensation: normal\par \par Special Tests:\par Clarissa:Negative \par Dejuan: Negative\par Anterior Drawer:Negative\par Posterior Drawer:Negative \par Varus/Valgus:Negative, no instability\par \par \par

## 2022-07-07 NOTE — DISCUSSION/SUMMARY
[Medication Risks Reviewed] : Medication risks reviewed [de-identified] : The patient is a 51 year old woman presenting with acute on chronic left knee pain secondary to knee osteoarthritis with loose bodies, with hemarthrosis.\par \par We again discussed natural progression of arthritis, as well as nonoperative and operative treatment options.\par \par At this point, patient is failing conservative measures, and would consider surgical options.\par \par After informed consent, and explanation of risks, benefits, alternatives, adverse effects of injection, which includes but is not limited to infection, bleeding, allergic reaction, swelling, soft tissue weakening/tendon rupture, neurovascular injury, injection site complication, fat atrophy, skin depigmentation, failure to improve symptoms, the patient would like to proceed with the procedure - LEFT KNEE ULTRASOUND-GUIDED ARTHROCENTESIS. See procedure note above. Patient tolerated the procedure well. The patient was provided with postinjection instructions.\par \par Patient was prescribed a short course of Diclofenac.Appropriate use of medication was reviewed with the patient in detail. Risks, benefits, and adverse effects medication were discussed.\par \par Patient was prescribed a short course of Tramadol for breakthrough pain.  Prior to prescribing, history of the patient's scheduled medication use was reviewed utilizing the I:STOP [place]  aware program.  Appropriate use of medication was discussed with the patient in detail.  The risks, benefits, adverse effects, alternative options were discussed.  The patient expressed understanding.\par \par \par The patient was counseled on Protection-Rest-Ice-Compression-Elevation (PRICE).\par \par Continue follow-up with Dr. Ha.\par \par I explained to the patient that I will be leaving at the end of July 2022.  With established patients, I will continue to provide care during that time.  I explained that the Orthopedic team will be sending out letters regarding my departure and we have provided referrals in-office as requested.\par \par \par \par ------------------------------------------------------------------------------------------------------------------\par Patient appreciates and agrees with current plan.\par \par The patient's diagnosis above was evaluated by me, personally.  Diagnostic Testing and treatment options were discussed with the patient in detail.  The risks/benefits/potential complications of diagnostic testing/treatments were described in detail.  \par \par This note was generated using a mixture of manual typing and dragon medical dictation software.  A reasonable effort has been made for proofreading its contents, but typos may still remain.  If there are any questions or points of clarification needed please notify my office.\par \par \par >15 minutes of time was spent on total encounter.  >50% of the visit was spent on face-to-face counseling/coordination of care and medical-decision making for this patient.\par \par

## 2022-07-23 LAB
CULTURE RESULTS: NO GROWTH — SIGNIFICANT CHANGE UP
SPECIMEN SOURCE: SIGNIFICANT CHANGE UP

## 2022-08-03 ENCOUNTER — APPOINTMENT (OUTPATIENT)
Dept: ORTHOPEDIC SURGERY | Facility: CLINIC | Age: 52
End: 2022-08-03
